# Patient Record
Sex: FEMALE | Race: WHITE | NOT HISPANIC OR LATINO | URBAN - METROPOLITAN AREA
[De-identification: names, ages, dates, MRNs, and addresses within clinical notes are randomized per-mention and may not be internally consistent; named-entity substitution may affect disease eponyms.]

---

## 2018-04-25 ENCOUNTER — EMERGENCY (EMERGENCY)
Facility: HOSPITAL | Age: 33
LOS: 1 days | Discharge: HOME | End: 2018-04-25
Attending: EMERGENCY MEDICINE
Payer: COMMERCIAL

## 2018-04-25 VITALS
TEMPERATURE: 98 F | OXYGEN SATURATION: 99 % | HEIGHT: 63 IN | HEART RATE: 90 BPM | WEIGHT: 179.9 LBS | RESPIRATION RATE: 14 BRPM | DIASTOLIC BLOOD PRESSURE: 86 MMHG | SYSTOLIC BLOOD PRESSURE: 133 MMHG

## 2018-04-25 DIAGNOSIS — Z98.51 TUBAL LIGATION STATUS: Chronic | ICD-10-CM

## 2018-04-25 PROCEDURE — 99283 EMERGENCY DEPT VISIT LOW MDM: CPT

## 2018-04-25 RX ORDER — AMOXICILLIN 250 MG/5ML
1 SUSPENSION, RECONSTITUTED, ORAL (ML) ORAL
Qty: 0 | Refills: 0 | COMMUNITY

## 2018-04-25 NOTE — ED ADULT TRIAGE NOTE - CHIEF COMPLAINT QUOTE
"My whole mouth hurts; I think I have an abscess."  pt states she has infection in her mouth, has appt. with oral surgeon on May 3rd, her dentist prescribed amoxicillan, but pt called dentist asking for pain medication and they agreed but pt states that they only gave her ibuprofen and she needs something stronger

## 2018-04-25 NOTE — ED PROVIDER NOTE - ENMT, MLM
Airway patent, Nasal mucosa clear. Mouth with normal mucosa. Throat has no vesicles, R upper posterior molar ttp with surrounding erythema and ttp to gum, no evidence of abscess formation at this time.

## 2018-04-25 NOTE — ED PROVIDER NOTE - OBJECTIVE STATEMENT
R upper molar pain for 2 days saw her dentist yesterday,  who prescribed 800 mg of ibuprofen which pt has been taking but pain was bad all night.  her dentist's office is still not open and pt needs pain relief.

## 2019-01-16 NOTE — ED PROVIDER NOTE - CROS ED CONS ALL NEG
negative... Counseling Text: I reviewed the side effect in detail. Patient should get monthly blood tests, not donate blood, not drive at night if vision affected, and not share medication. Most Recent Triglycerides (Optional): 72 Hypertriglyceridemia Monitoring: I explained this is common when taking isotretinoin. If this worsens they will contact us. Hypertriglyceridemia Treatment: I explained this is common when taking isotretinoin. If this worsens they will contact us. They may try OTC ibuprofen. Hypercholesterolemia Monitoring: I explained this is common when taking isotretinoin. We will monitor closely. Patient Weight (Optional But Required For Cumulative Dose-Numbers And Decimals Only): 110 Months Of Therapy Completed: 1 Headache Monitoring: I recommended monitoring the headaches for now. There is no evidence of increased intracranial pressure. They were instructed to call if the headaches are worsening. Cheilitis Normal Treatment: I recommended application of Vaseline or Aquaphor numerous times a day (as often as every hour) and before going to bed. Display Individual Monthly Dosage In The Note (If Yes Will Display All Dosages Which Are Not N/A): yes Cheilitis Aggressive Treatment: I recommended application of Vaseline or Aquaphor numerous times a day (as often as every hour) and before going to bed. I also prescribed a topical steroid for twice daily use. Dosing Month 1 (Required For Cumulative Dosing): 30mg BID Female Pregnancy Counseling Text: Female patients should also be on two forms of birth control while taking this medication and for one month after their last dose. Retinoid Dermatitis Normal Treatment: I recommended more frequent application of Cetaphil or CeraVe to the areas of dermatitis. Retinoid Dermatitis Aggressive Treatment: I recommended more frequent application of Cetaphil or CeraVe to the areas of dermatitis. I also prescribed a topical steroid for twice daily use until the dermatitis resolves. Nosebleeds Normal Treatment: I explained this is common when taking isotretinoin. I recommended saline mist in each nostril multiple times a day. If this worsens they will contact us. Detail Level: Zone Xerosis Normal Treatment: I recommended application of Cetaphil or CeraVe numerous times a day going to bed to all dry areas. Pounds Preamble Statement (Weight Entered In Details Tab): Reported Weight in pounds: What Is The Patient's Gender: Female Xerosis Aggressive Treatment: I recommended application of Cetaphil or CeraVe numerous times a day going to bed to all dry areas. I also prescribed a topical steroid for twice daily use. Kilograms Preamble Statement (Weight Entered In Details Tab): Reported Weight in kilograms: Female Completion Statement: After discussing her treatment course we decided to discontinue isotretinoin therapy at this time. I explained that she would need to continue her birth control methods for at least one month after the last dosage. She should also get a pregnancy test one month after the last dose. She shouldn't donate blood for one month after the last dose. She should call with any new symptoms of depression. Completed Therapy?: No Male Completion Statement: After discussing his treatment course we decided to discontinue isotretinoin therapy at this time. He shouldn't donate blood for one month after the last dose. He should call with any new symptoms of depression. Most Recent Lfts (Optional): AST 18 ALT 13 Xerosis Normal Treatment: I recommended application of Cetaphil or CeraVe numerous times a day and before going to bed to all dry areas. Xerosis Aggressive Treatment: I recommended application of Cetaphil or CeraVe numerous times a day and before going to bed to all dry areas. I also prescribed a topical steroid for twice daily use. Ipledge Number (Optional): [id number] Weight Units: pounds

## 2021-11-14 NOTE — ED PROVIDER NOTE - EXITCARE/DISCHARGE INSTRUCTIONS
Internal Medicine Progress Note      Subjective:    Pt is still having significant shortness of breath. Neb tx do not seem to help very much.  She does feel anxious and feels restless inside.   No chest pain.    Review of Systems  Negative for all 10 systems except as per subjective    I/O's    Intake/Output Summary (Last 24 hours) at 11/14/2021 1215  Last data filed at 11/13/2021 1526  Gross per 24 hour   Intake 360 ml   Output --   Net 360 ml         ALLERGIES:  [facility] Meds  Current Facility-Administered Medications   Medication Dose Route Frequency Provider Last Rate Last Admin   • methylPREDNISolone (SOLU-Medrol) PF injection 40 mg  40 mg Intravenous 2 times per day Yang Mujica MD       • ALPRAZolam (XANAX) tablet 0.25 mg  0.25 mg Oral BID Fritz Higgins MD       • hydrochlorothiazide (HYDRODIURIL) tablet 12.5 mg  12.5 mg Oral Daily Fritz Higgins MD       • levothyroxine (SYNTHROID, LEVOTHROID) tablet 125 mcg  125 mcg Oral QAM AC Fritz Higgins MD   125 mcg at 11/14/21 0606   • lisinopril (ZESTRIL) tablet 10 mg  10 mg Oral Daily Fritz Higgins MD       • atorvastatin (LIPITOR) tablet 20 mg  20 mg Oral Nightly Fritz Higgins MD   20 mg at 11/13/21 2043   • sodium chloride 0.9 % flush bag 25 mL  25 mL Intravenous PRN Fritz Higgins MD       • sodium chloride (PF) 0.9 % injection 2 mL  2 mL Intracatheter 2 times per day Fritz Higgins MD   2 mL at 11/13/21 1754   • nicotine (NICODERM) 21 MG/24HR patch 1 patch  1 patch Transdermal Daily Fritz Higgins MD   1 patch at 11/14/21 0859   • ipratropium-albuterol (DUONEB) 0.5-2.5 (3) MG/3ML nebulizer solution 3 mL  3 mL Nebulization Q6H Resp Fritz Higgins MD   3 mL at 11/14/21 0920   • ipratropium-albuterol (DUONEB) 0.5-2.5 (3) MG/3ML nebulizer solution 3 mL  3 mL Nebulization Q6H Resp PRN Fritz Higgins MD   3 mL at 11/13/21 1547   • lactated ringers infusion   Intravenous Continuous Fritz Higgins MD 83 mL/hr at 11/13/21 1749 New Bag at 11/13/21 1749        Last Recorded Vitals  Visit  Vitals  /61   Pulse 84   Temp 98.6 °F (37 °C) (Oral)   Resp 16   Ht 5' 5\" (1.651 m)   Wt 79.4 kg (175 lb 0.7 oz)   SpO2 94%   BMI 29.13 kg/m²       SpO2 Readings from Last 3 Encounters:   11/14/21 94%        Physical Exam  Vitals and nursing note reviewed.   Constitutional:       General: She is not in acute distress.     Appearance: Normal appearance. She is well-developed.   HENT:      Head: Normocephalic and atraumatic.   Eyes:      Conjunctiva/sclera: Conjunctivae normal.      Pupils: Pupils are equal, round, and reactive to light.   Cardiovascular:      Rate and Rhythm: Normal rate and regular rhythm.      Heart sounds: Normal heart sounds.   Pulmonary:      Effort: Pulmonary effort is normal. No respiratory distress.      Comments: Diminished breath sounds, worse on right, with quiet wheeze, coarse inspiratory sounds bilat  Abdominal:      General: Bowel sounds are normal. There is no distension.      Palpations: Abdomen is soft. There is no mass.      Tenderness: There is no abdominal tenderness.   Musculoskeletal:         General: Normal range of motion.      Cervical back: Normal range of motion and neck supple.   Lymphadenopathy:      Cervical: No cervical adenopathy.   Skin:     General: Skin is warm and dry.   Neurological:      General: No focal deficit present.      Mental Status: She is alert.      Cranial Nerves: No cranial nerve deficit.         Labs   Recent Results (from the past 24 hour(s))   Comprehensive Metabolic Panel    Collection Time: 11/14/21  5:42 AM   Result Value Ref Range    Fasting Status      Sodium 133 (L) 135 - 145 mmol/L    Potassium 4.0 3.4 - 5.1 mmol/L    Chloride 101 98 - 107 mmol/L    Carbon Dioxide 28 21 - 32 mmol/L    Anion Gap 8 (L) 10 - 20 mmol/L    Glucose 107 (H) 70 - 99 mg/dL    BUN 18 6 - 20 mg/dL    Creatinine 0.77 0.51 - 0.95 mg/dL    Glomerular Filtration Rate 72 >=60    BUN/ Creatinine Ratio 23 7 - 25    Calcium 9.5 8.4 - 10.2 mg/dL    Bilirubin, Total 0.5  0.2 - 1.0 mg/dL    GOT/AST 19 <=37 Units/L    GPT/ALT 15 <64 Units/L    Alkaline Phosphatase 86 45 - 117 Units/L    Albumin 3.0 (L) 3.6 - 5.1 g/dL    Protein, Total 6.9 6.4 - 8.2 g/dL    Globulin 3.9 2.0 - 4.0 g/dL    A/G Ratio 0.8 (L) 1.0 - 2.4   Thyroid Stimulating Hormone Reflex    Collection Time: 11/14/21  5:42 AM   Result Value Ref Range    TSH 1.140 0.350 - 5.000 mcUnits/mL   CBC with Automated Differential (performable only)    Collection Time: 11/14/21  5:42 AM   Result Value Ref Range    WBC 7.3 4.2 - 11.0 K/mcL    RBC 4.58 4.00 - 5.20 mil/mcL    HGB 13.0 12.0 - 15.5 g/dL    HCT 40.9 36.0 - 46.5 %    MCV 89.3 78.0 - 100.0 fl    MCH 28.4 26.0 - 34.0 pg    MCHC 31.8 (L) 32.0 - 36.5 g/dL    RDW-CV 13.9 11.0 - 15.0 %    RDW-SD 45.2 39.0 - 50.0 fL     140 - 450 K/mcL    NRBC 0 <=0 /100 WBC    Neutrophil, Percent 72 %    Lymphocytes, Percent 18 %    Mono, Percent 8 %    Eosinophils, Percent 1 %    Basophils, Percent 1 %    Immature Granulocytes 0 %    Absolute Neutrophils 5.3 1.8 - 7.7 K/mcL    Absolute Lymphocytes 1.3 1.0 - 4.0 K/mcL    Absolute Monocytes 0.6 0.3 - 0.9 K/mcL    Absolute Eosinophils  0.1 0.0 - 0.5 K/mcL    Absolute Basophils 0.1 0.0 - 0.3 K/mcL    Absolute Immmature Granulocytes 0.0 0.0 - 0.2 K/mcL       Imaging  CTA CHEST PULMONARY EMBOLISM W CONTRAST   Final Result   1.  No evidence for pulmonary embolism.   2.  There is an abnormal rounded mass lesion predominantly noted    involving the superior segment of the left lower lobe, measuring    approximately 5.1 x 8 x 8.5 cm.  The mass extends to the left hilum and    appears to encase the regional bronchial structures.  Primary    consideration is primary lung carcinoma.   3.  There is asymmetric peribronchial cuffing and interlobular septal    thickening to the left lower lobe and left upper lobe.  While this may    represent postobstructive edema, attention expressed of carcinomatosis is    also a diagnostic consideration.   4.   Extensive bilateral hilar and mediastinal lymphadenopathy.   5.  Subtle areas of ground-glass in the right upper lobe are sub-solid in    appearance, measuring approximately 17 mm in diameter.  While this may    represent subsegmental atelectasis.  Subtle areas of metastatic    involvement is also a consideration.     5.  Trace pericardial effusion.            Electronically signed by Thiago Singh MD on 11 13 21 at 02:16      XR CHEST PA AND LATERAL 2 VIEWS   Final Result      Large mass within the left upper lobe, highly concerning for primary lung   malignancy.  Advise further evaluation dedicated CT chest.      Electronically Signed by: MADHAVI ZARATE MD    Signed on: 11/12/2021 3:53 PM              Cultures  Microbiology Results     None             Assessment/Plan:      Lung mass with lymphadenopathy  COPD  Acute hypoxemic respiratory failure  - concerning for metastatic malignancy  - bronchodilator protocol, O2  - steroids resumed per Pulmonary recs  - bronchoscopy tomorrow     HTN  - BP controlled     Hypothyroid  - will check TSH    Hyponatremia  Hypercalcemia  - hypercalcemia may be malignancy mediated     Tobacco abuse  - nicotine patches ordered     DVT prophylaxis with enoxaparin      Will start alprazolam for the anxiety/restlessness. Discussed risk of respiratory depression, will give low dose.  Await bronchoscopy results.           Primary Care Physician  Сергей Moreno MD    Code Status    Code Status: Full Resuscitation      Fritz Higgins MD  11/14/2021 12:15 PM           Launch [employer] and print the 'Prescriptions from this Visit' Report

## 2022-06-02 NOTE — ED PROVIDER NOTE - NS ED MD EM SELECTION
[Follow-Up Consultation] : a follow-up consultation visit for [Family Member] : family member [Mother] : mother [Ad Hoc ] : provided by an ad hoc  [Interpreters_Relationshiptopatient] : Leon [[id number]] : [place] [id number] Exp Problem Focused - Mod. Complex